# Patient Record
Sex: MALE | Race: WHITE | NOT HISPANIC OR LATINO | Employment: FULL TIME | URBAN - METROPOLITAN AREA
[De-identification: names, ages, dates, MRNs, and addresses within clinical notes are randomized per-mention and may not be internally consistent; named-entity substitution may affect disease eponyms.]

---

## 2024-01-22 ENCOUNTER — HOSPITAL ENCOUNTER (EMERGENCY)
Facility: HOSPITAL | Age: 44
Discharge: HOME | End: 2024-01-22
Attending: EMERGENCY MEDICINE
Payer: COMMERCIAL

## 2024-01-22 VITALS
SYSTOLIC BLOOD PRESSURE: 197 MMHG | BODY MASS INDEX: 38.36 KG/M2 | HEART RATE: 83 BPM | RESPIRATION RATE: 16 BRPM | OXYGEN SATURATION: 96 % | DIASTOLIC BLOOD PRESSURE: 143 MMHG | HEIGHT: 76 IN | TEMPERATURE: 97.3 F | WEIGHT: 315 LBS

## 2024-01-22 DIAGNOSIS — K64.4 EXTERNAL HEMORRHOID: Primary | ICD-10-CM

## 2024-01-22 PROCEDURE — 99283 EMERGENCY DEPT VISIT LOW MDM: CPT | Performed by: EMERGENCY MEDICINE

## 2024-01-22 RX ORDER — LIDOCAINE HYDROCHLORIDE AND HYDROCORTISONE ACETATE 30; 5 MG/G; MG/G
1 CREAM TOPICAL 2 TIMES DAILY
Qty: 28.3 G | Refills: 0 | Status: SHIPPED | OUTPATIENT
Start: 2024-01-22 | End: 2024-01-29

## 2024-01-22 ASSESSMENT — PAIN SCALES - GENERAL: PAINLEVEL_OUTOF10: 0 - NO PAIN

## 2024-01-22 ASSESSMENT — COLUMBIA-SUICIDE SEVERITY RATING SCALE - C-SSRS
1. IN THE PAST MONTH, HAVE YOU WISHED YOU WERE DEAD OR WISHED YOU COULD GO TO SLEEP AND NOT WAKE UP?: NO
6. HAVE YOU EVER DONE ANYTHING, STARTED TO DO ANYTHING, OR PREPARED TO DO ANYTHING TO END YOUR LIFE?: NO
2. HAVE YOU ACTUALLY HAD ANY THOUGHTS OF KILLING YOURSELF?: NO

## 2024-01-22 ASSESSMENT — PAIN - FUNCTIONAL ASSESSMENT: PAIN_FUNCTIONAL_ASSESSMENT: 0-10

## 2024-01-22 ASSESSMENT — PAIN DESCRIPTION - FREQUENCY: FREQUENCY: INTERMITTENT

## 2024-01-22 ASSESSMENT — PAIN DESCRIPTION - LOCATION: LOCATION: RECTUM

## 2024-01-22 NOTE — ED PROVIDER NOTES
HPI   Chief Complaint   Patient presents with    Constipation     Patient presents to the ED with c/o some constipation that caused light bleeding.  He normally does not have bathroom issues but did this past week and he is very concerned.       Patient is a 43-year-old male presenting with rectal bleeding, constipation (that has resolved).  States that Tuesday-Wednesday of last week he began to experience some constipation with hard stools, had to strain with a bowel movement last Wednesday, after a hard bowel movement began to notice scant blood on the toilet paper.  Noted a small mount of blood in his underwear that day as well.  Noted ongoing blood with wiping/intermixed with stool over the last several days.  Denies any dark black or bloody stools.  Denies any abdominal pain.  Denies any hematemesis/vomiting.  Denies any history of GI bleeding in the past.  Denies any history of coagulopathy or anticoagulant medication use.  Denies any traumatic injury to the rectum.                          Gema Coma Scale Score: 15                  Patient History   No past medical history on file.  No past surgical history on file.  No family history on file.  Social History     Tobacco Use    Smoking status: Not on file    Smokeless tobacco: Not on file   Substance Use Topics    Alcohol use: Not on file    Drug use: Not on file       Physical Exam   ED Triage Vitals [01/22/24 0043]   Temp Heart Rate Respirations BP   36.3 °C (97.3 °F) 83 16 (!) 197/143      Pulse Ox Temp src Heart Rate Source Patient Position   96 % -- -- --      BP Location FiO2 (%)     -- --       Physical Exam  Vitals and nursing note reviewed.   Constitutional:       General: He is not in acute distress.     Appearance: Normal appearance. He is not ill-appearing or toxic-appearing.   HENT:      Head: Normocephalic and atraumatic.      Nose: No congestion or rhinorrhea.      Mouth/Throat:      Mouth: Mucous membranes are moist.      Pharynx:  Oropharynx is clear. No oropharyngeal exudate or posterior oropharyngeal erythema.   Eyes:      Extraocular Movements: Extraocular movements intact.      Right eye: Normal extraocular motion.      Left eye: Normal extraocular motion.      Conjunctiva/sclera: Conjunctivae normal.      Pupils: Pupils are equal, round, and reactive to light.   Cardiovascular:      Rate and Rhythm: Normal rate and regular rhythm.      Pulses: Normal pulses.      Heart sounds: Normal heart sounds, S1 normal and S2 normal. No murmur heard.     No friction rub. No gallop.   Pulmonary:      Effort: Pulmonary effort is normal. No respiratory distress.      Breath sounds: Normal breath sounds. No stridor. No wheezing, rhonchi or rales.   Abdominal:      General: Abdomen is flat. Bowel sounds are normal. There is no distension.      Palpations: Abdomen is soft.      Tenderness: There is no abdominal tenderness. There is no right CVA tenderness, left CVA tenderness, guarding or rebound.   Genitourinary:     Rectum: External hemorrhoid present. No mass, anal fissure or internal hemorrhoid.   Musculoskeletal:      Cervical back: Full passive range of motion without pain.      Right lower leg: No edema.      Left lower leg: No edema.   Skin:     General: Skin is warm and dry.   Neurological:      General: No focal deficit present.      Mental Status: He is alert and oriented to person, place, and time.      GCS: GCS eye subscore is 4. GCS verbal subscore is 5. GCS motor subscore is 6.      Cranial Nerves: No cranial nerve deficit.      Sensory: No sensory deficit.      Motor: No weakness, tremor or abnormal muscle tone.   Psychiatric:         Mood and Affect: Mood normal.         ED Course & MDM   Diagnoses as of 01/22/24 0243   External hemorrhoid       Medical Decision Making  Patient presenting with blood on toilet paper/intermittently with hard stools over the last week, on examination appears to have a thrombosed external hemorrhoid.  Patient  does not seem to be markedly symptomatic from this from a pain perspective and symptoms appear to be ongoing greater than 72 hours, therefore feel that patient would be a poor candidate for excision at this time.  Will treat conservatively with lidocaine/hydrocortisone and sitz bath's, will also provide referral to colorectal surgery for further evaluation.  Patient discharged in good condition with plan for outpatient follow-up.        Procedure  Procedures     Josué Patrick MD  01/22/24 2008

## 2024-03-15 NOTE — PROGRESS NOTES
No chief complaint on file.      History Of Present Illness  Alber Perdue is a 43 y.o. male presenting with rectal bleeding.    Subjective   Alber Perdue presents for evaluation of rectal bleeding. Symptoms began in January after an episode of constipation. He was seen in the ED 1/22/24 and on exam had a thrombosed external hemorrhoid. No pain anymore, no bleeding since then. Bowel function back to baseline.     Pain: No  Protruding Tissue: No  Bleeding: No     Bowel habits:  Moves bowels every other day  Stool is soft  he  does not strain and spends 15 minutes on the toilet to have a BM    Dietary history:   He does not take any fiber powder but is trying to eat more foods that are high in fiber     Colonoscopy:     Non-smoker/No ETOH/No Illicit drug use  PMH: DVT 8 years ago when admitted for CHF not on eliquis, HTN, HLD, DM  PSH:  NKDA  No family history of CRC or IBD  Employment:       Past Medical History  No past medical history on file.    Surgical History  No past surgical history on file.     Social History  He has no history on file for tobacco use, alcohol use, and drug use.    Family History  No family history on file.     Allergies  Patient has no known allergies.    Home Medications  Prior to Admission medications    Not on File       Review of Systems   Constitutional:  Negative for activity change, appetite change, chills, diaphoresis, fatigue, fever and unexpected weight change.   Gastrointestinal:  Negative for abdominal pain, anal bleeding, blood in stool, constipation, diarrhea, nausea, rectal pain and vomiting.        Physical Exam  Constitutional:       Appearance: Normal appearance.   Abdominal:      General: Abdomen is flat. There is no distension.      Palpations: Abdomen is soft. There is no mass.      Tenderness: There is no abdominal tenderness.      Hernia: No hernia is present.   Neurological:      Mental Status: He is alert.         Perineal/Rectal Exam  Perineum: WNL  BORIS:  WNL  Tone: WNL    Anoscopy    Date/Time: 3/19/2024 2:26 PM    Performed by: Maral Babcock MD  Authorized by: Maral Babcock MD    Consent:     Consent obtained:  Verbal    Consent given by:  Patient    Risks, benefits, and alternatives were discussed: yes      Risks discussed:  Bleeding and pain  Universal protocol:     Procedure explained and questions answered to patient or proxy's satisfaction: yes      Patient identity confirmed:  Verbally with patient  Indications:     Indications: rectal bleeding    Post-procedure details:     Procedure completion:  Tolerated well, no immediate complications         Last Recorded Vitals  There were no vitals taken for this visit.    Relevant Results          Assessment/Plan     Recovered. Completely normal exam today.   I recommended a bowel management program including a high fiber diet (30-40 gm qd) that includes both soluble and insoluble fiber, a fiber supplement such as Metamucil, Konsyl, Benefiber 1-2 qd, and at least 8-10 glasses of fluid daily. We also discussed limited toilet sitting time. Patient education material was provided.

## 2024-03-19 ENCOUNTER — OFFICE VISIT (OUTPATIENT)
Dept: SURGERY | Facility: CLINIC | Age: 44
End: 2024-03-19
Payer: COMMERCIAL

## 2024-03-19 VITALS
WEIGHT: 315 LBS | BODY MASS INDEX: 44.67 KG/M2 | HEART RATE: 87 BPM | DIASTOLIC BLOOD PRESSURE: 78 MMHG | SYSTOLIC BLOOD PRESSURE: 133 MMHG

## 2024-03-19 DIAGNOSIS — K64.8 OTHER HEMORRHOIDS: Primary | ICD-10-CM

## 2024-03-19 PROCEDURE — 46600 DIAGNOSTIC ANOSCOPY SPX: CPT | Performed by: SURGERY

## 2024-03-19 PROCEDURE — 99214 OFFICE O/P EST MOD 30 MIN: CPT | Mod: 24 | Performed by: SURGERY

## 2024-03-19 PROCEDURE — 99204 OFFICE O/P NEW MOD 45 MIN: CPT | Performed by: SURGERY

## 2024-03-19 ASSESSMENT — ENCOUNTER SYMPTOMS
FEVER: 0
ANAL BLEEDING: 0
ACTIVITY CHANGE: 0
NAUSEA: 0
RECTAL BLEEDING: 1
ABDOMINAL PAIN: 0
BLOOD IN STOOL: 0
VOMITING: 0
UNEXPECTED WEIGHT CHANGE: 0
RECTAL PAIN: 0
DIAPHORESIS: 0
DIARRHEA: 0
CHILLS: 0
FATIGUE: 0
CONSTIPATION: 0
APPETITE CHANGE: 0

## 2024-06-04 ENCOUNTER — HOSPITAL ENCOUNTER (EMERGENCY)
Facility: HOSPITAL | Age: 44
Discharge: HOME | End: 2024-06-05
Payer: COMMERCIAL

## 2024-06-04 ENCOUNTER — APPOINTMENT (OUTPATIENT)
Dept: RADIOLOGY | Facility: HOSPITAL | Age: 44
End: 2024-06-04
Payer: COMMERCIAL

## 2024-06-04 VITALS
HEART RATE: 97 BPM | RESPIRATION RATE: 18 BRPM | SYSTOLIC BLOOD PRESSURE: 157 MMHG | BODY MASS INDEX: 38.36 KG/M2 | WEIGHT: 315 LBS | DIASTOLIC BLOOD PRESSURE: 97 MMHG | TEMPERATURE: 98.4 F | OXYGEN SATURATION: 96 % | HEIGHT: 76 IN

## 2024-06-04 DIAGNOSIS — M79.671 FOOT PAIN, RIGHT: Primary | ICD-10-CM

## 2024-06-04 PROCEDURE — 73630 X-RAY EXAM OF FOOT: CPT | Mod: RIGHT SIDE | Performed by: STUDENT IN AN ORGANIZED HEALTH CARE EDUCATION/TRAINING PROGRAM

## 2024-06-04 PROCEDURE — 73630 X-RAY EXAM OF FOOT: CPT | Mod: RT

## 2024-06-04 PROCEDURE — 99283 EMERGENCY DEPT VISIT LOW MDM: CPT | Mod: 25

## 2024-06-04 ASSESSMENT — COLUMBIA-SUICIDE SEVERITY RATING SCALE - C-SSRS
6. HAVE YOU EVER DONE ANYTHING, STARTED TO DO ANYTHING, OR PREPARED TO DO ANYTHING TO END YOUR LIFE?: NO
1. IN THE PAST MONTH, HAVE YOU WISHED YOU WERE DEAD OR WISHED YOU COULD GO TO SLEEP AND NOT WAKE UP?: NO
2. HAVE YOU ACTUALLY HAD ANY THOUGHTS OF KILLING YOURSELF?: NO

## 2024-06-04 ASSESSMENT — LIFESTYLE VARIABLES
HAVE YOU EVER FELT YOU SHOULD CUT DOWN ON YOUR DRINKING: NO
EVER HAD A DRINK FIRST THING IN THE MORNING TO STEADY YOUR NERVES TO GET RID OF A HANGOVER: NO
EVER FELT BAD OR GUILTY ABOUT YOUR DRINKING: NO
HAVE PEOPLE ANNOYED YOU BY CRITICIZING YOUR DRINKING: NO
TOTAL SCORE: 0

## 2024-06-04 ASSESSMENT — PAIN SCALES - GENERAL: PAINLEVEL_OUTOF10: 5 - MODERATE PAIN

## 2024-06-04 ASSESSMENT — PAIN DESCRIPTION - LOCATION: LOCATION: FOOT

## 2024-06-04 ASSESSMENT — PAIN - FUNCTIONAL ASSESSMENT: PAIN_FUNCTIONAL_ASSESSMENT: 0-10

## 2024-06-04 ASSESSMENT — PAIN DESCRIPTION - ORIENTATION: ORIENTATION: RIGHT

## 2024-06-04 ASSESSMENT — PAIN DESCRIPTION - PAIN TYPE: TYPE: ACUTE PAIN

## 2024-06-05 PROCEDURE — 2500000001 HC RX 250 WO HCPCS SELF ADMINISTERED DRUGS (ALT 637 FOR MEDICARE OP): Performed by: PHYSICIAN ASSISTANT

## 2024-06-05 RX ORDER — OXYCODONE AND ACETAMINOPHEN 5; 325 MG/1; MG/1
1 TABLET ORAL ONCE
Status: COMPLETED | OUTPATIENT
Start: 2024-06-05 | End: 2024-06-05

## 2024-06-05 RX ORDER — OXYCODONE AND ACETAMINOPHEN 5; 325 MG/1; MG/1
1 TABLET ORAL EVERY 6 HOURS PRN
Qty: 5 TABLET | Refills: 0 | Status: SHIPPED | OUTPATIENT
Start: 2024-06-05 | End: 2024-06-08

## 2024-06-05 RX ADMIN — OXYCODONE HYDROCHLORIDE AND ACETAMINOPHEN 1 TABLET: 5; 325 TABLET ORAL at 01:29

## 2024-06-05 NOTE — ED TRIAGE NOTES
Pt here with c/o right foot pain. Pt states it started with with pain in his R great toe and now the entire foot hurts.

## 2024-06-05 NOTE — ED PROVIDER NOTES
EMERGENCY MEDICINE EVALUATION NOTE    History of Present Illness     Chief Complaint:   Chief Complaint   Patient presents with    foot pain       HPI: Alber Perdue is a 43 y.o. male presents with a chief complaint of right toe pain.  Patient reports that it started Saturday while he was walking around a [place] tour.  He states that as he drove back the pain got worse and he started having tingling and numbness in the right great toe.  He states that there is no redness or warmth noted.  He has no history of gout.  He does state that as the days have gone by the pain has spread up his foot.  Patient reports that he still able to ambulate but has some pain with that.  He states has been taking Tylenol at home for symptoms which have not improved his pain.  Patient reports that he has a history of CHF and has chronic swelling to the legs.    Previous History   No past medical history on file.  No past surgical history on file.  Social History     Tobacco Use    Smoking status: Never     No family history on file.  No Known Allergies  Current Outpatient Medications   Medication Instructions    oxyCODONE-acetaminophen (Percocet) 5-325 mg tablet 1 tablet, oral, Every 6 hours PRN       Physical Exam     Appearance: Alert, oriented , cooperative      Skin: Intact, dry skin, no lesions, rash, petechiae or purpura.      Eyes: PERRLA, EOMs intact,  Conjunctiva pink     ENT: Hearing grossly intact. Pharynx clear     Neck: Supple. Trachea at midline.      Pulmonary: Clear bilaterally. No rales, rhonchi or wheezing. No accessory muscle use or stridor.     Cardiac: Normal rate and rhythm without murmur     Abdomen: Soft, nontender, active bowel sounds.     Musculoskeletal: Full range of motion.  Lymphedema noted to bilateral lower extremities.  Neurovascular intact.  Minimal tenderness over right great toe.  No redness or warmth appreciated.  Patient able to ambulate under his own power.     Neurological:Cranial nerves II  "through XII are grossly intact, normal sensation, no weakness, no focal findings identified.     Results   Labs Reviewed - No data to display  XR foot right 3+ views   Final Result   Soft tissue edema of the dorsum of the foot extending into the distal   leg without gas or evidence of osteomyelitis.        Multiple potential etiologies of atraumatic foot pain as described in   detail above, depending on location and clinical context.        MACRO:   None.        Signed by: Donovan Brantley 6/5/2024 1:03 AM   Dictation workstation:   [id number]            ED Course & Medical Decision Making   Medications - No data to display  Heart Rate:  [97]   Temperature:  [36.9 °C (98.4 °F)]   Respirations:  [18]   BP: (157)/(97)   Height:  [193 cm (6' 4\")]   Weight:  [166 kg (366 lb)]   Pulse Ox:  [96 %]    ED Course as of 06/05/24 0106 Wed Jun 05, 2024   0102 Reviewed patient's x-ray myself.  There does not appear to be any obvious fractures or dislocations present.  Patient does have some sesamoid bones near the first metacarpal.  This potentially could be irritating the patient's foot.  Plan of care were discussed with the patient and he is in agreement the plan of care of discharge at this time.  Patient will be given a few pain medications at home for breakthrough pain.  He will also given referral to orthopedics.  Patient signs and symptoms do not appear to be consistent with gout so I do not think patient needs gout treatment.  Patient also has a history of diabetes so he cannot be placed on steroids.  Patient encouraged return here or follow-up with orthopedics.  There are no signs of cellulitis or nothing.  Patient needs antibiotics at this time. [CJ]   0105 Patient's foot x-ray was read there does not appear to be any acute abnormalities.  Patient be discharged as previously planned. [CJ]      ED Course User Index  [CJ] Dennis Mcfadden PA-C         Diagnoses as of 06/05/24 0106   Foot pain, right       Procedures "   Procedures    Diagnosis     1. Foot pain, right        Disposition   Discharged    ED Prescriptions       Medication Sig Dispense Start Date End Date Auth. Provider    oxyCODONE-acetaminophen (Percocet) 5-325 mg tablet Take 1 tablet by mouth every 6 hours if needed for severe pain (7 - 10) for up to 3 days. 5 tablet 6/5/2024 6/8/2024 Dennis Mcfadden PA-C            Disclaimer: This note was dictated by speech recognition. Minor errors in transcription may be present. Please call if questions.       Dennis Mcfadden PA-C  06/05/24 0105       Dennis Mcfadden PA-C  06/05/24 0106